# Patient Record
Sex: FEMALE | Race: ASIAN | Employment: FULL TIME | URBAN - METROPOLITAN AREA
[De-identification: names, ages, dates, MRNs, and addresses within clinical notes are randomized per-mention and may not be internally consistent; named-entity substitution may affect disease eponyms.]

---

## 2019-01-01 ENCOUNTER — APPOINTMENT (OUTPATIENT)
Dept: CT IMAGING | Facility: HOSPITAL | Age: 29
DRG: 955 | End: 2019-01-01
Attending: NURSE PRACTITIONER
Payer: COMMERCIAL

## 2019-01-01 ENCOUNTER — HOSPITAL ENCOUNTER (INPATIENT)
Facility: HOSPITAL | Age: 29
LOS: 13 days | DRG: 955 | End: 2019-01-01
Attending: EMERGENCY MEDICINE | Admitting: SURGERY
Payer: COMMERCIAL

## 2019-01-01 ENCOUNTER — ANESTHESIA EVENT (OUTPATIENT)
Dept: SURGERY | Facility: HOSPITAL | Age: 29
End: 2019-01-01

## 2019-01-01 ENCOUNTER — ANESTHESIA (OUTPATIENT)
Dept: SURGERY | Facility: HOSPITAL | Age: 29
End: 2019-01-01

## 2019-01-01 ENCOUNTER — APPOINTMENT (OUTPATIENT)
Dept: GENERAL RADIOLOGY | Facility: HOSPITAL | Age: 29
DRG: 955 | End: 2019-01-01
Attending: NURSE PRACTITIONER
Payer: COMMERCIAL

## 2019-01-01 ENCOUNTER — APPOINTMENT (OUTPATIENT)
Dept: CV DIAGNOSTICS | Facility: HOSPITAL | Age: 29
DRG: 955 | End: 2019-01-01
Attending: INTERNAL MEDICINE
Payer: COMMERCIAL

## 2019-01-01 ENCOUNTER — APPOINTMENT (OUTPATIENT)
Dept: GENERAL RADIOLOGY | Facility: HOSPITAL | Age: 29
DRG: 955 | End: 2019-01-01
Attending: INTERNAL MEDICINE
Payer: COMMERCIAL

## 2019-01-01 ENCOUNTER — HOSPITAL ENCOUNTER (EMERGENCY)
Facility: HOSPITAL | Age: 29
Discharge: STILL ADMITTED OR ADMINISTRATIVE | End: 2019-01-01

## 2019-01-01 ENCOUNTER — APPOINTMENT (OUTPATIENT)
Dept: GENERAL RADIOLOGY | Facility: HOSPITAL | Age: 29
DRG: 955 | End: 2019-01-01
Attending: Other
Payer: COMMERCIAL

## 2019-01-01 ENCOUNTER — APPOINTMENT (OUTPATIENT)
Dept: CT IMAGING | Facility: HOSPITAL | Age: 29
DRG: 955 | End: 2019-01-01
Attending: EMERGENCY MEDICINE
Payer: COMMERCIAL

## 2019-01-01 ENCOUNTER — APPOINTMENT (OUTPATIENT)
Dept: INTERVENTIONAL RADIOLOGY/VASCULAR | Facility: HOSPITAL | Age: 29
DRG: 955 | End: 2019-01-01
Attending: NURSE PRACTITIONER
Payer: COMMERCIAL

## 2019-01-01 ENCOUNTER — APPOINTMENT (OUTPATIENT)
Dept: CT IMAGING | Facility: HOSPITAL | Age: 29
DRG: 955 | End: 2019-01-01
Attending: NEUROLOGICAL SURGERY
Payer: COMMERCIAL

## 2019-01-01 ENCOUNTER — APPOINTMENT (OUTPATIENT)
Dept: CT IMAGING | Facility: HOSPITAL | Age: 29
DRG: 955 | End: 2019-01-01
Attending: Other
Payer: COMMERCIAL

## 2019-01-01 VITALS
RESPIRATION RATE: 16 BRPM | HEART RATE: 145 BPM | SYSTOLIC BLOOD PRESSURE: 139 MMHG | WEIGHT: 139.75 LBS | DIASTOLIC BLOOD PRESSURE: 92 MMHG | HEIGHT: 64 IN | BODY MASS INDEX: 23.86 KG/M2 | OXYGEN SATURATION: 76 % | TEMPERATURE: 101 F

## 2019-01-01 DIAGNOSIS — S06.6X3A: Primary | ICD-10-CM

## 2019-01-01 DIAGNOSIS — S06.5X9A ACUTE SUBDURAL HEMATOMA (HCC): ICD-10-CM

## 2019-01-01 PROCEDURE — 99233 SBSQ HOSP IP/OBS HIGH 50: CPT | Performed by: HOSPITALIST

## 2019-01-01 PROCEDURE — 70450 CT HEAD/BRAIN W/O DYE: CPT | Performed by: NEUROLOGICAL SURGERY

## 2019-01-01 PROCEDURE — 70450 CT HEAD/BRAIN W/O DYE: CPT | Performed by: OTHER

## 2019-01-01 PROCEDURE — 99291 CRITICAL CARE FIRST HOUR: CPT | Performed by: OTHER

## 2019-01-01 PROCEDURE — 71045 X-RAY EXAM CHEST 1 VIEW: CPT | Performed by: OTHER

## 2019-01-01 PROCEDURE — 99232 SBSQ HOSP IP/OBS MODERATE 35: CPT | Performed by: HOSPITALIST

## 2019-01-01 PROCEDURE — 93005 ELECTROCARDIOGRAM TRACING: CPT

## 2019-01-01 PROCEDURE — 99024 POSTOP FOLLOW-UP VISIT: CPT | Performed by: NEUROLOGICAL SURGERY

## 2019-01-01 PROCEDURE — 95951 EEG MONITORING/VIDEORECORD: CPT | Performed by: OTHER

## 2019-01-01 PROCEDURE — 00C70ZZ EXTIRPATION OF MATTER FROM CEREBRAL HEMISPHERE, OPEN APPROACH: ICD-10-PCS | Performed by: NEUROLOGICAL SURGERY

## 2019-01-01 PROCEDURE — 99232 SBSQ HOSP IP/OBS MODERATE 35: CPT | Performed by: INTERNAL MEDICINE

## 2019-01-01 PROCEDURE — 70450 CT HEAD/BRAIN W/O DYE: CPT | Performed by: NURSE PRACTITIONER

## 2019-01-01 PROCEDURE — 93306 TTE W/DOPPLER COMPLETE: CPT | Performed by: INTERNAL MEDICINE

## 2019-01-01 PROCEDURE — 00C40ZZ EXTIRPATION OF MATTER FROM INTRACRANIAL SUBDURAL SPACE, OPEN APPROACH: ICD-10-PCS | Performed by: NEUROLOGICAL SURGERY

## 2019-01-01 PROCEDURE — 30233N1 TRANSFUSION OF NONAUTOLOGOUS RED BLOOD CELLS INTO PERIPHERAL VEIN, PERCUTANEOUS APPROACH: ICD-10-PCS | Performed by: INTERNAL MEDICINE

## 2019-01-01 PROCEDURE — 99255 IP/OBS CONSLTJ NEW/EST HI 80: CPT | Performed by: INTERNAL MEDICINE

## 2019-01-01 PROCEDURE — 99291 CRITICAL CARE FIRST HOUR: CPT | Performed by: NURSE PRACTITIONER

## 2019-01-01 PROCEDURE — 99024 POSTOP FOLLOW-UP VISIT: CPT | Performed by: PHYSICIAN ASSISTANT

## 2019-01-01 PROCEDURE — 70498 CT ANGIOGRAPHY NECK: CPT | Performed by: NURSE PRACTITIONER

## 2019-01-01 PROCEDURE — 30233R1 TRANSFUSION OF NONAUTOLOGOUS PLATELETS INTO PERIPHERAL VEIN, PERCUTANEOUS APPROACH: ICD-10-PCS | Performed by: INTERNAL MEDICINE

## 2019-01-01 PROCEDURE — 72125 CT NECK SPINE W/O DYE: CPT | Performed by: EMERGENCY MEDICINE

## 2019-01-01 PROCEDURE — 99253 IP/OBS CNSLTJ NEW/EST LOW 45: CPT | Performed by: NURSE PRACTITIONER

## 2019-01-01 PROCEDURE — 74177 CT ABD & PELVIS W/CONTRAST: CPT | Performed by: EMERGENCY MEDICINE

## 2019-01-01 PROCEDURE — 0BH17EZ INSERTION OF ENDOTRACHEAL AIRWAY INTO TRACHEA, VIA NATURAL OR ARTIFICIAL OPENING: ICD-10-PCS | Performed by: EMERGENCY MEDICINE

## 2019-01-01 PROCEDURE — 70496 CT ANGIOGRAPHY HEAD: CPT | Performed by: NURSE PRACTITIONER

## 2019-01-01 PROCEDURE — B31CYZZ FLUOROSCOPY OF BILATERAL EXTERNAL CAROTID ARTERIES USING OTHER CONTRAST: ICD-10-PCS

## 2019-01-01 PROCEDURE — 99254 IP/OBS CNSLTJ NEW/EST MOD 60: CPT | Performed by: SURGERY

## 2019-01-01 PROCEDURE — 99292 CRITICAL CARE ADDL 30 MIN: CPT | Performed by: NURSE PRACTITIONER

## 2019-01-01 PROCEDURE — B318YZZ FLUOROSCOPY OF BILATERAL INTERNAL CAROTID ARTERIES USING OTHER CONTRAST: ICD-10-PCS

## 2019-01-01 PROCEDURE — 02HV33Z INSERTION OF INFUSION DEVICE INTO SUPERIOR VENA CAVA, PERCUTANEOUS APPROACH: ICD-10-PCS | Performed by: INTERNAL MEDICINE

## 2019-01-01 PROCEDURE — 71045 X-RAY EXAM CHEST 1 VIEW: CPT | Performed by: INTERNAL MEDICINE

## 2019-01-01 PROCEDURE — 71260 CT THORAX DX C+: CPT | Performed by: EMERGENCY MEDICINE

## 2019-01-01 PROCEDURE — 5A1955Z RESPIRATORY VENTILATION, GREATER THAN 96 CONSECUTIVE HOURS: ICD-10-PCS | Performed by: EMERGENCY MEDICINE

## 2019-01-01 PROCEDURE — 99253 IP/OBS CNSLTJ NEW/EST LOW 45: CPT | Performed by: NEUROLOGICAL SURGERY

## 2019-01-01 PROCEDURE — B31GYZZ FLUOROSCOPY OF BILATERAL VERTEBRAL ARTERIES USING OTHER CONTRAST: ICD-10-PCS

## 2019-01-01 PROCEDURE — 71275 CT ANGIOGRAPHY CHEST: CPT | Performed by: NURSE PRACTITIONER

## 2019-01-01 PROCEDURE — 70450 CT HEAD/BRAIN W/O DYE: CPT | Performed by: EMERGENCY MEDICINE

## 2019-01-01 DEVICE — BURR HOLE COVER, WITH TAB, 10MM
Type: IMPLANTABLE DEVICE | Site: HEAD | Status: FUNCTIONAL
Brand: UNIVERSAL NEURO 3

## 2019-01-01 DEVICE — SCREW, AXS, SELF-DRILLING
Type: IMPLANTABLE DEVICE | Site: HEAD | Status: FUNCTIONAL
Brand: UNIVERSAL NEURO 3

## 2019-01-01 DEVICE — COLLAGEN DURA SUBSTITUTE MEMBRANE 5IN X 7IN (12.5CM X 17.5CM)
Type: IMPLANTABLE DEVICE | Site: HEAD | Status: FUNCTIONAL
Brand: DURAMATRIX ONLAY

## 2019-01-01 DEVICE — KIT TISS CLOS TISSEEL 4ML: Type: IMPLANTABLE DEVICE | Site: HEAD | Status: FUNCTIONAL

## 2019-01-01 DEVICE — DOUBLE-Y PLATE
Type: IMPLANTABLE DEVICE | Site: HEAD | Status: FUNCTIONAL
Brand: UNIVERSAL NEURO 3

## 2019-01-01 RX ORDER — HEPARIN SODIUM 5000 [USP'U]/ML
5000 INJECTION, SOLUTION INTRAVENOUS; SUBCUTANEOUS EVERY 8 HOURS SCHEDULED
Status: DISCONTINUED | OUTPATIENT
Start: 2019-01-01 | End: 2019-01-01

## 2019-01-01 RX ORDER — MIDAZOLAM HYDROCHLORIDE 5 MG/ML
2 INJECTION INTRAMUSCULAR; INTRAVENOUS ONCE
Status: DISCONTINUED | OUTPATIENT
Start: 2019-01-01 | End: 2019-01-01

## 2019-01-01 RX ORDER — ACETAMINOPHEN 325 MG/1
650 TABLET ORAL EVERY 6 HOURS PRN
Status: DISCONTINUED | OUTPATIENT
Start: 2019-01-01 | End: 2019-01-01

## 2019-01-01 RX ORDER — 3% SODIUM CHLORIDE 3 G/100ML
INJECTION, SOLUTION INTRAVENOUS CONTINUOUS
Status: DISCONTINUED | OUTPATIENT
Start: 2019-01-01 | End: 2019-01-01

## 2019-01-01 RX ORDER — VERAPAMIL HYDROCHLORIDE 2.5 MG/ML
INJECTION, SOLUTION INTRAVENOUS
Status: COMPLETED
Start: 2019-01-01 | End: 2019-01-01

## 2019-01-01 RX ORDER — METOPROLOL TARTRATE 5 MG/5ML
2.5 INJECTION INTRAVENOUS EVERY 4 HOURS
Status: DISCONTINUED | OUTPATIENT
Start: 2019-01-01 | End: 2019-01-01

## 2019-01-01 RX ORDER — LORAZEPAM 2 MG/ML
1 INJECTION INTRAMUSCULAR EVERY 4 HOURS PRN
Status: CANCELLED | OUTPATIENT
Start: 2019-01-01

## 2019-01-01 RX ORDER — GLYCOPYRROLATE 0.2 MG/ML
0.2 INJECTION, SOLUTION INTRAMUSCULAR; INTRAVENOUS
Status: DISCONTINUED | OUTPATIENT
Start: 2019-01-01 | End: 2019-01-01

## 2019-01-01 RX ORDER — IPRATROPIUM BROMIDE AND ALBUTEROL SULFATE 2.5; .5 MG/3ML; MG/3ML
3 SOLUTION RESPIRATORY (INHALATION) 3 TIMES DAILY
Status: DISCONTINUED | OUTPATIENT
Start: 2019-01-01 | End: 2019-01-01

## 2019-01-01 RX ORDER — GLYCOPYRROLATE 0.2 MG/ML
0.2 INJECTION, SOLUTION INTRAMUSCULAR; INTRAVENOUS
Status: CANCELLED | OUTPATIENT
Start: 2019-01-01

## 2019-01-01 RX ORDER — BACITRACIN 50000 [USP'U]/1
INJECTION, POWDER, LYOPHILIZED, FOR SOLUTION INTRAMUSCULAR AS NEEDED
Status: DISCONTINUED | OUTPATIENT
Start: 2019-01-01 | End: 2019-01-01 | Stop reason: HOSPADM

## 2019-01-01 RX ORDER — POTASSIUM CHLORIDE 29.8 MG/ML
40 INJECTION INTRAVENOUS ONCE
Status: COMPLETED | OUTPATIENT
Start: 2019-01-01 | End: 2019-01-01

## 2019-01-01 RX ORDER — ONDANSETRON 2 MG/ML
4 INJECTION INTRAMUSCULAR; INTRAVENOUS EVERY 6 HOURS PRN
Status: DISCONTINUED | OUTPATIENT
Start: 2019-01-01 | End: 2019-01-01

## 2019-01-01 RX ORDER — MANNITOL 20 G/100ML
100 INJECTION, SOLUTION INTRAVENOUS ONCE
Status: COMPLETED | OUTPATIENT
Start: 2019-01-01 | End: 2019-01-01

## 2019-01-01 RX ORDER — MANNITOL 20 G/100ML
60 INJECTION, SOLUTION INTRAVENOUS ONCE
Status: DISCONTINUED | OUTPATIENT
Start: 2019-01-01 | End: 2019-01-01

## 2019-01-01 RX ORDER — NALOXONE HYDROCHLORIDE 0.4 MG/ML
80 INJECTION, SOLUTION INTRAMUSCULAR; INTRAVENOUS; SUBCUTANEOUS AS NEEDED
Status: ACTIVE | OUTPATIENT
Start: 2019-01-01 | End: 2019-01-01

## 2019-01-01 RX ORDER — SODIUM PHOSPHATE, DIBASIC AND SODIUM PHOSPHATE, MONOBASIC 7; 19 G/133ML; G/133ML
1 ENEMA RECTAL ONCE AS NEEDED
Status: DISCONTINUED | OUTPATIENT
Start: 2019-01-01 | End: 2019-01-01

## 2019-01-01 RX ORDER — ACETAMINOPHEN 160 MG/5ML
650 SOLUTION ORAL EVERY 6 HOURS PRN
Status: DISCONTINUED | OUTPATIENT
Start: 2019-01-01 | End: 2019-01-01

## 2019-01-01 RX ORDER — IPRATROPIUM BROMIDE AND ALBUTEROL SULFATE 2.5; .5 MG/3ML; MG/3ML
3 SOLUTION RESPIRATORY (INHALATION) 2 TIMES DAILY
Status: DISCONTINUED | OUTPATIENT
Start: 2019-01-01 | End: 2019-01-01

## 2019-01-01 RX ORDER — PHENYLEPHRINE HCL IN 0.9% NACL 50MG/250ML
PLASTIC BAG, INJECTION (ML) INTRAVENOUS CONTINUOUS PRN
Status: DISCONTINUED | OUTPATIENT
Start: 2019-01-01 | End: 2019-01-01

## 2019-01-01 RX ORDER — MORPHINE SULFATE 4 MG/ML
2 INJECTION, SOLUTION INTRAMUSCULAR; INTRAVENOUS ONCE
Status: CANCELLED | OUTPATIENT
Start: 2019-01-01

## 2019-01-01 RX ORDER — MORPHINE SULFATE 4 MG/ML
4 INJECTION, SOLUTION INTRAMUSCULAR; INTRAVENOUS EVERY 2 HOUR PRN
Status: DISCONTINUED | OUTPATIENT
Start: 2019-01-01 | End: 2019-01-01

## 2019-01-01 RX ORDER — MINERAL OIL AND PETROLATUM 150; 830 MG/G; MG/G
OINTMENT OPHTHALMIC 3 TIMES DAILY
Status: DISCONTINUED | OUTPATIENT
Start: 2019-01-01 | End: 2019-01-01

## 2019-01-01 RX ORDER — LORAZEPAM 2 MG/ML
1 INJECTION INTRAMUSCULAR
Status: DISCONTINUED | OUTPATIENT
Start: 2019-01-01 | End: 2019-01-01

## 2019-01-01 RX ORDER — SODIUM CHLORIDE, SODIUM LACTATE, POTASSIUM CHLORIDE, CALCIUM CHLORIDE 600; 310; 30; 20 MG/100ML; MG/100ML; MG/100ML; MG/100ML
INJECTION, SOLUTION INTRAVENOUS CONTINUOUS
Status: CANCELLED | OUTPATIENT
Start: 2019-01-01

## 2019-01-01 RX ORDER — POLYETHYLENE GLYCOL 3350 17 G/17G
17 POWDER, FOR SOLUTION ORAL DAILY PRN
Status: DISCONTINUED | OUTPATIENT
Start: 2019-01-01 | End: 2019-01-01

## 2019-01-01 RX ORDER — MIDAZOLAM HYDROCHLORIDE 1 MG/ML
INJECTION INTRAMUSCULAR; INTRAVENOUS
Status: DISCONTINUED
Start: 2019-01-01 | End: 2019-01-01 | Stop reason: WASHOUT

## 2019-01-01 RX ORDER — ACETAMINOPHEN 650 MG/1
650 SUPPOSITORY RECTAL EVERY 6 HOURS PRN
Status: DISCONTINUED | OUTPATIENT
Start: 2019-01-01 | End: 2019-01-01

## 2019-01-01 RX ORDER — METOPROLOL TARTRATE 5 MG/5ML
2.5 INJECTION INTRAVENOUS EVERY 6 HOURS
Status: DISCONTINUED | OUTPATIENT
Start: 2019-01-01 | End: 2019-01-01

## 2019-01-01 RX ORDER — ACETAMINOPHEN 650 MG/1
650 SUPPOSITORY RECTAL EVERY 4 HOURS PRN
Status: DISCONTINUED | OUTPATIENT
Start: 2019-01-01 | End: 2019-01-01

## 2019-01-01 RX ORDER — LABETALOL HYDROCHLORIDE 5 MG/ML
10 INJECTION, SOLUTION INTRAVENOUS EVERY 10 MIN PRN
Status: DISCONTINUED | OUTPATIENT
Start: 2019-01-01 | End: 2019-01-01

## 2019-01-01 RX ORDER — FAMOTIDINE 20 MG/1
20 TABLET ORAL 2 TIMES DAILY
Status: DISCONTINUED | OUTPATIENT
Start: 2019-01-01 | End: 2019-01-01

## 2019-01-01 RX ORDER — MANNITOL 20 G/100ML
INJECTION, SOLUTION INTRAVENOUS
Status: DISPENSED
Start: 2019-01-01 | End: 2019-01-01

## 2019-01-01 RX ORDER — HEPARIN SODIUM 5000 [USP'U]/ML
INJECTION, SOLUTION INTRAVENOUS; SUBCUTANEOUS
Status: COMPLETED
Start: 2019-01-01 | End: 2019-01-01

## 2019-01-01 RX ORDER — LORAZEPAM 2 MG/ML
0.5 INJECTION INTRAMUSCULAR
Status: DISCONTINUED | OUTPATIENT
Start: 2019-01-01 | End: 2019-01-01

## 2019-01-01 RX ORDER — MORPHINE SULFATE 4 MG/ML
2 INJECTION, SOLUTION INTRAMUSCULAR; INTRAVENOUS ONCE
Status: DISCONTINUED | OUTPATIENT
Start: 2019-01-01 | End: 2019-01-01

## 2019-01-01 RX ORDER — GLYCOPYRROLATE 0.2 MG/ML
0.4 INJECTION, SOLUTION INTRAMUSCULAR; INTRAVENOUS ONCE
Status: CANCELLED | OUTPATIENT
Start: 2019-01-01 | End: 2019-01-01

## 2019-01-01 RX ORDER — MORPHINE SULFATE 4 MG/ML
1 INJECTION, SOLUTION INTRAMUSCULAR; INTRAVENOUS EVERY 2 HOUR PRN
Status: DISCONTINUED | OUTPATIENT
Start: 2019-01-01 | End: 2019-01-01

## 2019-01-01 RX ORDER — METOCLOPRAMIDE HYDROCHLORIDE 5 MG/ML
10 INJECTION INTRAMUSCULAR; INTRAVENOUS EVERY 8 HOURS PRN
Status: DISCONTINUED | OUTPATIENT
Start: 2019-01-01 | End: 2019-01-01

## 2019-01-01 RX ORDER — LORAZEPAM 2 MG/ML
1 INJECTION INTRAMUSCULAR EVERY 4 HOURS PRN
Status: DISCONTINUED | OUTPATIENT
Start: 2019-01-01 | End: 2019-01-01

## 2019-01-01 RX ORDER — LIDOCAINE HYDROCHLORIDE AND EPINEPHRINE 10; 10 MG/ML; UG/ML
INJECTION, SOLUTION INFILTRATION; PERINEURAL AS NEEDED
Status: DISCONTINUED | OUTPATIENT
Start: 2019-01-01 | End: 2019-01-01 | Stop reason: HOSPADM

## 2019-01-01 RX ORDER — MORPHINE SULFATE 4 MG/ML
2 INJECTION, SOLUTION INTRAMUSCULAR; INTRAVENOUS EVERY 2 HOUR PRN
Status: DISCONTINUED | OUTPATIENT
Start: 2019-01-01 | End: 2019-01-01

## 2019-01-01 RX ORDER — POTASSIUM CHLORIDE 14.9 MG/ML
20 INJECTION INTRAVENOUS ONCE
Status: COMPLETED | OUTPATIENT
Start: 2019-01-01 | End: 2019-01-01

## 2019-01-01 RX ORDER — HYDROMORPHONE HYDROCHLORIDE 1 MG/ML
0.5 INJECTION, SOLUTION INTRAMUSCULAR; INTRAVENOUS; SUBCUTANEOUS
Status: DISCONTINUED | OUTPATIENT
Start: 2019-01-01 | End: 2019-01-01

## 2019-01-01 RX ORDER — BISACODYL 10 MG
10 SUPPOSITORY, RECTAL RECTAL
Status: DISCONTINUED | OUTPATIENT
Start: 2019-01-01 | End: 2019-01-01

## 2019-01-01 RX ORDER — SODIUM CHLORIDE 30 MG/ML INHALATION SOLUTION 30 MG/ML
3 SOLUTION INHALANT
Status: DISCONTINUED | OUTPATIENT
Start: 2019-01-01 | End: 2019-01-01

## 2019-01-01 RX ORDER — LORAZEPAM 2 MG/ML
1 INJECTION INTRAMUSCULAR ONCE
Status: DISCONTINUED | OUTPATIENT
Start: 2019-01-01 | End: 2019-01-01

## 2019-01-01 RX ORDER — POTASSIUM CHLORIDE 1.5 G/1.77G
40 POWDER, FOR SOLUTION ORAL EVERY 4 HOURS
Status: COMPLETED | OUTPATIENT
Start: 2019-01-01 | End: 2019-01-01

## 2019-01-01 RX ORDER — MIDAZOLAM HYDROCHLORIDE 5 MG/ML
2 INJECTION INTRAMUSCULAR; INTRAVENOUS ONCE
Status: COMPLETED | OUTPATIENT
Start: 2019-01-01 | End: 2019-01-01

## 2019-01-01 RX ORDER — DOCUSATE SODIUM 100 MG/1
100 CAPSULE, LIQUID FILLED ORAL 2 TIMES DAILY
Status: DISCONTINUED | OUTPATIENT
Start: 2019-01-01 | End: 2019-01-01

## 2019-01-01 RX ORDER — LORAZEPAM 2 MG/ML
1 INJECTION INTRAMUSCULAR ONCE
Status: CANCELLED | OUTPATIENT
Start: 2019-01-01

## 2019-01-01 RX ORDER — CHLORHEXIDINE GLUCONATE 0.12 MG/ML
15 RINSE ORAL
Status: DISCONTINUED | OUTPATIENT
Start: 2019-01-01 | End: 2019-01-01

## 2019-01-01 RX ORDER — LIDOCAINE HYDROCHLORIDE 10 MG/ML
INJECTION, SOLUTION INFILTRATION; PERINEURAL
Status: COMPLETED
Start: 2019-01-01 | End: 2019-01-01

## 2019-01-01 RX ORDER — SODIUM CHLORIDE 9 MG/ML
INJECTION, SOLUTION INTRAVENOUS ONCE
Status: COMPLETED | OUTPATIENT
Start: 2019-01-01 | End: 2019-01-01

## 2019-01-01 RX ORDER — SCOLOPAMINE TRANSDERMAL SYSTEM 1 MG/1
1 PATCH, EXTENDED RELEASE TRANSDERMAL
Status: DISCONTINUED | OUTPATIENT
Start: 2019-01-01 | End: 2019-01-01

## 2019-01-01 RX ORDER — BUPIVACAINE HYDROCHLORIDE AND EPINEPHRINE 2.5; 5 MG/ML; UG/ML
INJECTION, SOLUTION EPIDURAL; INFILTRATION; INTRACAUDAL; PERINEURAL AS NEEDED
Status: DISCONTINUED | OUTPATIENT
Start: 2019-01-01 | End: 2019-01-01 | Stop reason: HOSPADM

## 2019-01-01 RX ORDER — CEFAZOLIN SODIUM/WATER 2 G/20 ML
2 SYRINGE (ML) INTRAVENOUS EVERY 8 HOURS
Status: DISCONTINUED | OUTPATIENT
Start: 2019-01-01 | End: 2019-01-01

## 2019-01-01 RX ORDER — CEFAZOLIN SODIUM 1 G/3ML
INJECTION, POWDER, FOR SOLUTION INTRAMUSCULAR; INTRAVENOUS
Status: DISCONTINUED | OUTPATIENT
Start: 2019-01-01 | End: 2019-01-01 | Stop reason: HOSPADM

## 2019-01-01 RX ORDER — ACETAMINOPHEN 325 MG/1
650 TABLET ORAL EVERY 4 HOURS PRN
Status: DISCONTINUED | OUTPATIENT
Start: 2019-01-01 | End: 2019-01-01

## 2019-01-01 RX ORDER — DEXTROSE AND SODIUM CHLORIDE 5; .45 G/100ML; G/100ML
INJECTION, SOLUTION INTRAVENOUS CONTINUOUS
Status: DISCONTINUED | OUTPATIENT
Start: 2019-01-01 | End: 2019-01-01

## 2019-01-01 RX ORDER — MAGNESIUM SULFATE HEPTAHYDRATE 40 MG/ML
2 INJECTION, SOLUTION INTRAVENOUS ONCE
Status: DISCONTINUED | OUTPATIENT
Start: 2019-01-01 | End: 2019-01-01

## 2019-01-01 RX ORDER — SODIUM CHLORIDE 9 MG/ML
INJECTION, SOLUTION INTRAVENOUS CONTINUOUS
Status: CANCELLED | OUTPATIENT
Start: 2019-01-01

## 2019-01-01 RX ORDER — ACETAMINOPHEN 160 MG/5ML
650 SOLUTION ORAL EVERY 4 HOURS PRN
Status: DISCONTINUED | OUTPATIENT
Start: 2019-01-01 | End: 2019-01-01

## 2019-01-01 RX ORDER — GLYCOPYRROLATE 0.2 MG/ML
0.4 INJECTION, SOLUTION INTRAMUSCULAR; INTRAVENOUS ONCE
Status: DISCONTINUED | OUTPATIENT
Start: 2019-01-01 | End: 2019-01-01

## 2019-01-01 RX ORDER — SENNOSIDES 8.6 MG
17.2 TABLET ORAL NIGHTLY
Status: DISCONTINUED | OUTPATIENT
Start: 2019-01-01 | End: 2019-01-01

## 2019-01-01 RX ORDER — SCOLOPAMINE TRANSDERMAL SYSTEM 1 MG/1
1 PATCH, EXTENDED RELEASE TRANSDERMAL
Status: CANCELLED | OUTPATIENT
Start: 2019-01-01

## 2019-01-01 RX ORDER — FAMOTIDINE 10 MG/ML
20 INJECTION, SOLUTION INTRAVENOUS 2 TIMES DAILY
Status: DISCONTINUED | OUTPATIENT
Start: 2019-01-01 | End: 2019-01-01

## 2019-01-01 RX ORDER — SODIUM CHLORIDE 0.9 % (FLUSH) 0.9 %
10 SYRINGE (ML) INJECTION AS NEEDED
Status: DISCONTINUED | OUTPATIENT
Start: 2019-01-01 | End: 2019-01-01

## 2019-01-01 RX ORDER — LABETALOL HYDROCHLORIDE 5 MG/ML
10 INJECTION, SOLUTION INTRAVENOUS AS NEEDED
Status: DISCONTINUED | OUTPATIENT
Start: 2019-01-01 | End: 2019-01-01

## 2019-01-01 RX ORDER — ETOMIDATE 2 MG/ML
INJECTION INTRAVENOUS
Status: COMPLETED | OUTPATIENT
Start: 2019-01-01 | End: 2019-01-01

## 2019-01-01 RX ORDER — SODIUM CHLORIDE 9 MG/ML
INJECTION, SOLUTION INTRAVENOUS CONTINUOUS
Status: DISCONTINUED | OUTPATIENT
Start: 2019-01-01 | End: 2019-01-01

## 2019-01-01 RX ORDER — GLYCOPYRROLATE 0.2 MG/ML
0.4 INJECTION, SOLUTION INTRAMUSCULAR; INTRAVENOUS ONCE
Status: COMPLETED | OUTPATIENT
Start: 2019-01-01 | End: 2019-01-01

## 2019-10-01 PROBLEM — S06.6X3A: Status: ACTIVE | Noted: 2019-01-01

## 2019-10-01 PROBLEM — S06.5X9A ACUTE SUBDURAL HEMATOMA (HCC): Status: ACTIVE | Noted: 2019-01-01

## 2019-10-02 PROBLEM — S06.4X9A EPIDURAL HEMATOMA (HCC): Status: ACTIVE | Noted: 2019-01-01

## 2019-10-02 NOTE — CONSULTS
ORTHOPEDIC SURGERY CONSULT FOLLOW-UP NOTE  Ortho Consulting Attending: Lola Delong MD  Primary Service: Neurosurgery    Intubated at this time, abdomen nondistended  SP craniectomy overnight, now with guarded prognosis  From an orthopedic standpoint, she
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
## 2019-10-02 NOTE — ANESTHESIA PREPROCEDURE EVALUATION
PRE-OP EVALUATION    Patient Name: Tamara Valenzuela    Pre-op Diagnosis: * No pre-op diagnosis entered *    * No procedures listed *    * No surgeons found in log *    Pre-op vitals reviewed.   Temp: 98.2 °F (36.8 °C)  Pulse: 96  Resp: 14  BP: 120/93  Arterial L Once   Followed by      [COMPLETED] potassium chloride IVPB premix 20 mEq 20 mEq Intravenous Once   3% NaCl (hypertonic) infusion  Intravenous Continuous   HYDROmorphone HCl (DILAUDID) 1 MG/ML injection 0.5 mg 0.5 mg Intravenous Q1H PRN   acetaminophen (TY 1,000 mg Intravenous Once       Outpatient Medications:    No outpatient medications have been marked as taking for the 10/1/19 encounter [facility] Encounter). Allergies: Patient has no allergy information on record.       Anesthesia Evaluation    Patien
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
## 2019-10-02 NOTE — CONSULTS
[facility]  Report of Consultation    Terris Nettle Patient Status:  Emergency    1990 MRN [id number]   Location [address] Attending Neha Shelton MD   Hosp Day # 0 PCP No primary care provider on file.      Date of icterus. Pupils are equal and sluggishly reactive- mucous membranes are moist.   TMs bilaterally are clear-no hemotympanum noted. Oropharynx is clear.   No evidence of  facial trauma  Neck: C-collar in place-no JVD noted-no subcutaneous emphysema  Lungs: hemisphere extending from anterior to posterior. The hematoma measures between 6   and 12 mm.   There is a thin subdural hematoma over the right convexity best seen on the coronal images at the level of the mid frontal operculum measuring approximately 3 m hematoma  CT C-spine-Skull base fractures. Occipital fractures. Fracture of the petrous temporal bone on the right now evident within section imaging at 1 mm sections.   Fracture involving the base of the left occipital bone and medial aspect of the garcia

## 2019-10-02 NOTE — ANESTHESIA POSTPROCEDURE EVALUATION
[address] [facility] Patient Status:  Inpatient   Age/Gender 29year old female MRN [id number]   [facility] SURGERY Attending Marques Retana, [address] [facility] Se Day # 1 PCP Unknown Pcp       Anesthesia Post-op Note    Procedure(s):  Jackson Yañez

## 2019-10-02 NOTE — OPERATIVE REPORT
[facility]    OPERATIVE REPORT    Patient:  Farrah Armenta;  YOB: 1990     CSN:  [id number]; Medical Record Number:  [id number]    Admission Date:  10/1/2019 Operation Date:  10/1/2019    . ..........................     Operating Physician: roll tied to the end of the bed via an aaron clamp. Ely holes were placed just above the zygoma, at the anatomic keyhole, over the coronal suture and over the parietal eminence. The dura was stripped from the inner table with a 3 Penfield dissector.
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
## 2019-10-02 NOTE — ANESTHESIA POSTPROCEDURE EVALUATION
[address] [facility] Patient Status:  Inpatient   Age/Gender 29year old female MRN [id number]   [facility] 6NE-A Attending Mary Marroquin MD   [facility] Day # 1 PCP Unknown Pcp       Anesthesia Post-op Note    Procedure(s):  LEFT [facility]

## 2019-10-02 NOTE — PROGRESS NOTES
Neurosurgery Update    Postop CT, subsequent CTA/CTV reviewed.   S/p craniectomy, with evacuation of prior SDH, with new areas of primarily left sided, frontal and temporal IPH, bifrontal epidural hematoma, as well as a large occipital and suboccipital extr

## 2019-10-02 NOTE — ANESTHESIA PREPROCEDURE EVALUATION
PRE-OP EVALUATION    Patient Name: Matthew Cronin    Pre-op Diagnosis: Subdural bleeding ([address] [I62.00]    Procedure(s):  LEFT CRANIECTOMY FOR SUBDURAL WITH POSSIBLE PLACEMENT OF   EXTERNAL VENTRICULAR DRAIN    Surgeon(s) and Role:     MD Ryan Hunt P reviewed.   Lab Results   Component Value Date    WBC 10.2 10/01/2019    RBC 4.17 10/01/2019    HGB 12.3 10/01/2019    HCT 35.0 10/01/2019    MCV 83.9 10/01/2019    MCH 29.5 10/01/2019    MCHC 35.1 10/01/2019    RDW 13.5 10/01/2019    .0 10/01/2019

## 2019-10-02 NOTE — OCCUPATIONAL THERAPY NOTE
OT order received, chart reviewed, pt intubated and on bed rest, poor prognosis, Rn stating to D/C OT order, OT will sign off at this time.

## 2019-10-02 NOTE — PHYSICAL THERAPY NOTE
Physical therapy consult received, chart reviewed. Pt intubated on bedrest, with poor prognosis. Will sign off at this time.

## 2019-10-02 NOTE — PROGRESS NOTES
[facility]  Neurosurgery Progress Note    Andreia Castro Patient Status:  Inpatient    1990 MRN [id number]   [facility] 6NE-A Attending Phillip Brooks MD   Hosp Day # 1 PCP Unknown Pcp     Chief Complaint:  Trauma  TBI    Subjective right frontal og hole. Overall gray-white matter differentiation is maintained. Interval development of numerous intraparenchymal hematomas. These are predominately on the left, with frontal, temporal and occipital components.   The largest is fro scan performed prior to CTA and CTV. Ventricles are also stable in caliber.        =====  CONCLUSION:  Multiple areas of contrast extravasation are suggested consistent with areas of active bleeding. Details as above.   Continued clinical correlation juan craniectomy for evacuation of SDH  Diffuse cerebral edema  Bifrontal epidural hematomas, occipital and suboccipital extra-axial hemorrhage, scattered IPH/SAH  Traumatic left transverse sinus laceration  Multiple skull fractures    Plan:  Pt examined by

## 2019-10-02 NOTE — SLP NOTE
Order received. Note patient orally intubated and not appropriate for evaluation. Will hold and continue to follow completing evaluation as appropriate.     [facility] Gray 87 CCC-SLP  Pager [phone]
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
## 2019-10-02 NOTE — ED NOTES
PT ADMIN KEPPRA IV IN R/T DECEREBRATE POSTURE - IV BLEW (LT HAND) KEPPRA STOPPED R/T ED MD WANTING PT TO BE RUSHED TO OR - COMMUNICATED KEPPRA FAILURE TO OR(AWARE).

## 2019-10-02 NOTE — PROGRESS NOTES
Attempted to see patient. Patient off the floor all morning. Pt noted to have purposeful movement on the Left and posturing on the Right.  Patient taken for CT scan, then taken to IR for possible embolization due to concern for bleeding into left transverse

## 2019-10-03 NOTE — PLAN OF CARE
Pt has been neurologically stable. Pt with purposeful movements on the left side. Right side withdraws from painful stimuli leg quicker than the arm. Eyes swollen; pupils round and reactive. Pt with cough and gag present.   Pt followed command once toda

## 2019-10-03 NOTE — PROGRESS NOTES
10/03/19 1457   Clinical Encounter Type   Visited With Family; Health care provider;Patient not available  (Brother, father, RN)   Routine Visit Follow-up   Continue Visiting No  (unless requested)   Patient Spiritual Encounters   Spiritual Needs   Bessie Gongora

## 2019-10-03 NOTE — PROGRESS NOTES
[facility]  Progress Note    Sorin Pinto Patient Status:  Inpatient    1990 MRN [id number]   [facility] 6NE-A Attending Bao Carter MD   Hosp Day # 2 PCP Unknown Pcp     Subjective:   The patient is currently sedated and on day 1 suboccipital craniotomy for evacuation of epidural hematoma      Plan:  1. Continue further management as per neurosurgery, ICU team and hospitalist.  2. No general surgical issues at this time. 3. We will sign off patient.   Please reconsult if need

## 2019-10-03 NOTE — IMAGING NOTE
Doctor is aware that with this exam, patient will have received a total of 400mL of contrast since admitted
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
## 2019-10-03 NOTE — RESPIRATORY THERAPY NOTE
Arterial line inserted into the Left Radial Artery with a 20 GA Arrow kit. Able to draw and flush from line with ease. Good waveform observed. Sterile Technique used.
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
## 2019-10-03 NOTE — PROGRESS NOTES
Tachy overnight to 130-140s. BP stable and Neuro exam unchanged. LR bolus given x1 as patient is 3L negative in I and O.  EKG obtained and showed some Twave abnormalities but no previous one to compare.   Troponin had been elevated on admission and is onl

## 2019-10-03 NOTE — BRIEF OP NOTE
Pre-Operative Diagnosis: Hematoma [T14. 8XXA]     Post-Operative Diagnosis: Hematoma [T14. 8XXA]      Procedure Performed:   Procedure(s):  Flora Roman with evacuation of hematoma      Surgeon(s) and Role:     Junior Gigi MD - Primary     *

## 2019-10-03 NOTE — OPERATIVE REPORT
[facility]    OPERATIVE REPORT    Patient:  Matthew Cronin;  YOB: 1990     CSN:  [id number]; Medical Record Number:  [id number]    Admission Date:  10/1/2019 Operation Date:  10/2/2019    . ..........................     Operating Physician: the left side of the occipital bone with underlying thick hematoma. [place] holes were place in the suboccipital region, and bilaterally above the transverse sinus. The craniotomy was completed with the craniotome and 2 pieces were removed.   Brisk bleeding w
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
## 2019-10-03 NOTE — PROGRESS NOTES
[facility] Patient Status:  Inpatient    1990 MRN [id number]   [facility] 6NE-A Attending Colin To MD   Hosp Day # 2 PCP Unknown Pcp     Pulm / Critical Care Progress Note     S: underwent cerebral angiogr Readings from Last 3 Encounters:  10/03/19 : 139 lb 5.3 oz (63.2 kg)       I/O last 3 completed shifts: In: 2452 [I.V.:1582; Blood:600; IV PIGGYBACK:270]  Out: 0331 [NLIOV:8549; Drains:225; Blood:500]  I/O this shift: In: 4892.9 [I.V.:3802.9;  Blood:250; follow INR. FFP if it continues to rise. 4. Fen: start tf  - start free water given hypernatremia  5. Prophy: scds; no heparin given sah / sdh  6. Dispo: DNR/DNi. Await further details regarding prognosis per neuro services. - d/w family at bedside.
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
## 2019-10-03 NOTE — PROGRESS NOTES
Resumed care of patient at [address]. Supplemented K+. EKG completed during shift. Elevated T waves, troponin and d-dimer, and -130s. Stat CTA completed, Negative for PE. No desaturations occurred during shift. Mannitol given per orders.  Pt on propofol,

## 2019-10-04 NOTE — PLAN OF CARE
Pt remains on vent 10 400 35 +5 Pt transported to CT and back to room without incident wean as adrián

## 2019-10-04 NOTE — PLAN OF CARE
Pt has been neurologically stable throughout the day. Pt ventilated and sedated with propofol and fentanyl gtts. Pt facial swelling improved today. Pupils remain round and reactive.   Left eye with intermittent in and downward gaze when both eyes opened
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
## 2019-10-04 NOTE — CM/SW NOTE
10/04/19 1300   CM/SW Referral Data   Referral Source Physician   Reason for Referral Discharge planning   Informant   (Brother)   Patient Info   Patient's Mental Status   (Vent)   Patient lives with   (Father)   Patient Status Prior to Admission   In[place]
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
## 2019-10-04 NOTE — PROGRESS NOTES
[facility] Patient Status:  Inpatient    1990 MRN [id number]   [facility] 6NE-A Attending Tegan Mcnally MD   Hosp Day # 3 PCP Unknown Pcp     Pulm / Critical Care Progress Note     S: remains intubated.   Per do Readings from Last 3 Encounters:  10/04/19 : 141 lb 8.6 oz (64.2 kg)       I/O last 3 completed shifts:   In: 7456.9 [I.V.:5496.9; Blood:850; IV PIGGYBACK:1110]  Out: 38148 [ZDJJX:06807; Drains:510; Blood:650]  I/O this shift:  In: 7708 [I.V.:943; Blood:289 vitamin K with some improvement  - follow INR. FFP if it continues to rise. 4. Anemia: drop overnight likely dilutional  - follow, transfuse for hgb < 7; repeat later this am to ensure not dropping precipitously  5.  Fen: start tf  - start free water giv

## 2019-10-04 NOTE — PROGRESS NOTES
Assessment and Objective  \"Progressing\"  INTERVENTIONS:  - Assess for changes in respiratory status  - Assess for changes in mentation and behavior  - Position to facilitate oxygenation and minimize respiratory effort  - Oxygen supplementation based on o From [address] [facility] by Commercial tube javier   Site Condition Dry   Req'd equipment at bedside Bag mask     Notes:   -Patient tolerating ventilator settings, Continue VC+10/400/35%/+5    -Diminished BS bilaterally  Plans:    Cont

## 2019-10-04 NOTE — PROGRESS NOTES
Resumed care at 1. Pt moving L side spontaneously and to pain, not following commands. R side moves to painful stimuli. Bilateral pupils equal, round, and reactive.  Eyes tracked briefly to light pen bilateral sides, minimal movement x 1 during shift but

## 2019-10-04 NOTE — DIETARY NOTE
Clinical Nutrition    Dietitian consult to initiate tube feedings. Given extensive TBI s/p craniotomy pt has very high protein needs. Recommend vital high protein formula @ 20 ml/hr, advancing by 10 ml every 8 hours. Flush 150 ml q 6 hours.  TF rate Goal of

## 2019-10-05 NOTE — RESPIRATORY THERAPY NOTE
Received pt vented, PRVC 10/400/35%/+5. Pt appears to be breath stacking. Dr. Sulaiman Angel increased Vt to 450, breath stacking appears improved. Suctioning small amount of thick yellow secretions from ETT. Will continue to monitor closely.

## 2019-10-05 NOTE — PROGRESS NOTES
[facility] Patient Status:  Inpatient    1990 MRN [id number]   [facility] 6NE-A Attending Cary Corrales MD   [facility] Day # 4 PCP Unknown Pcp     Critical Care Progress Note     Date of Admission: 10/1/2019  7:45 P **OR** [DISCONTINUED] acetaminophen (TYLENOL) 650 MG rectal suppository 650 mg, 650 mg, Rectal, Q6H PRN  •  fentaNYL citrate (SUBLIMAZE) 1,000 mcg in sodium chloride 0.9% 100 mL infusion,  mcg/hr, Intravenous, Continuous PRN  •  docusate sodium (COLA Readings from Last 3 Encounters:  10/04/19 : 141 lb 8.6 oz (64.2 kg)       I/O last 3 completed shifts: In: 3312.8 [I.V.:1844.8; Blood:289; NG/GT:939; IV PIGGYBACK:240]  Out: 5276 [XMUXR:2006; Drains:345]  I/O this shift:   In: 79 [NG/GT:70]  Out: 575 [Uri at length.      Critical Care Time greater than: 35 minutes    Anamaria Joshi MD  10/5/2019  3:28 AM
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
## 2019-10-05 NOTE — PLAN OF CARE
Assumed care of PT at 0730. Pt lightly sedated with 25mcg/hr Fentanyl continuous infusion and Propofol 5mcg/kg/min intermittently as needed.  Propofol off for several hours this am then pt became very restless spontaneously moving left arm and leg and scoot 1630 draw lower at 155. Serum Osmolarity also lower at 315.
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
## 2019-10-05 NOTE — PLAN OF CARE
Assumed care of this patient at approximately 1930. Monitor shows SR/ST 80's-110's, SBP remained within parameters of less than 140 per MD order. Patient remains intubated, lightly sedated on fentanyl and propofol.   Patient unable to follow commands, pupil seizure activity  - Administer anti-seizure medications as ordered  - Monitor neurological status  Outcome: Progressing  Goal: Achieves maximal functionality and self care  Description  INTERVENTIONS  - Monitor swallowing and airway patency with patient fa
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
## 2019-10-06 NOTE — CONSULTS
INFECTIOUS DISEASE CONSULT NOTE    Bernadette Jain Patient Status:  Inpatient    1990 MRN [id number]   [facility] 6NE-A Attending Scott Porter MD   Hosp Day # 5 PCP Unknown Pcp **OR** morphINE sulfate (PF) 4 MG/ML injection 2 mg, 2 mg, Intravenous, Q2H PRN **OR** morphINE sulfate (PF) 4 MG/ML injection 4 mg, 4 mg, Intravenous, Q2H PRN  •  Labetalol HCl (TRANDATE) injection 10 mg, 10 mg, Intravenous, PRN  •  phenylephrine in NaCl PRN  •  LORazepam (ATIVAN) injection 1 mg, 1 mg, Intravenous, Q15 Min PRN  •  Normal Saline Flush 0.9 % injection 10 mL, 10 mL, Intravenous, PRN  •  influenza vaccine split quad (FLULAVAL) ages 6 months to 64 years inj 0.5ml, 0.5 mL, Intramuscular, Prior t 22.0   < > 18.0*  --  26.0  --   --  29.0  --   --    ALKPHO 60 55  --  55  --   --   --   --   --   --   --    AST 52* 50*  --  18  --   --   --   --   --   --   --    ALT 41 39  --  17  --   --   --   --   --   --   --    BILT 0.3 0.6  --  0.6  --   --

## 2019-10-06 NOTE — PROGRESS NOTES
Pharmacy Dosing Service  Initial Pharmacokinetic Consult for Vancomycin Dosing          Brooke Mcmullen is a 29year old female who is being treated for pneumonia. Pharmacy has been asked to dose vancomycin by Dr. Amos Simons.     She has No Known Allergies function.         Glorine Goldberg, PharmD, BCPS  10/6/2019  1:15 PM  [address] [place] [address] Extension: [phone]

## 2019-10-06 NOTE — RESPIRATORY THERAPY NOTE
Patient received on PRVC 10/450/35%+5. No distress at this time. Routine care given. Suctioned a small amount of thin clear secretions orally. Will continue to monitor closely.

## 2019-10-06 NOTE — PLAN OF CARE
Assumed care of this patient at approximately 1930. Monitor shows SR/ST 80's-110's, SBP maintained within parameters of less than 140.   Patient does not follow commands, continues to move left upper and lower extremities spontaneously, right upper extremi respiratory difficulty  - Respiratory Therapy support as indicated  - Manage/alleviate anxiety  - Monitor for signs/symptoms of CO2 retention  Outcome: Progressing     Problem: NEUROLOGICAL - ADULT  Goal: Achieves stable or improved neurological status  Andreia Schroeder

## 2019-10-06 NOTE — RESPIRATORY THERAPY NOTE
Patient received on vent settings PRVC 10/450/+5/35%. Breath sounds- clear. No changes made. Will continue to monitor patient.
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
## 2019-10-06 NOTE — PROGRESS NOTES
Neurological Surgery Attending    Lemont Kussmaul    Interval History: Patient seen and examined earlier this morning shortly after nursing had reported a seizure. She was reexamined an hour later with clinical improvement.     Interval Examination: Despite ac
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
## 2019-10-06 NOTE — PROGRESS NOTES
[facility] Patient Status:  Inpatient    1990 MRN [id number]   [facility] 6NE-A Attending Colin To MD   [facility] Day # 5 PCP Unknown Pcp     Critical Care Progress Note     Date of Admission: 10/1/2019  7:45 P mg, Oral, Q6H PRN **OR** acetaminophen (TYLENOL) 160 MG/5ML oral liquid 650 mg, 650 mg, Oral, Q6H PRN **OR** [DISCONTINUED] acetaminophen (TYLENOL) 650 MG rectal suppository 650 mg, 650 mg, Rectal, Q6H PRN  •  fentaNYL citrate (SUBLIMAZE) 1,000 mcg in sodi [place].Breaker %] 35 %  S RR:  [10] 10  S VT:  [450 mL] 450 mL  PEEP/CPAP (cm H2O):  [5 cm H20] 5 cm H20  MAP (cm H2O):  [5-7] 6      Wt Readings from Last 3 Encounters:  10/05/19 : 138 lb 10.7 oz (62.9 kg)       I/O last 3 completed shifts: In: 5563.6 [I.V.:2540. 6 normalized  - s/p vitamin K with some improvement  - follow INR, plts  4. Anemia: transfused 1u PRBC yesterday with adequate response.  - follow, transfuse for hgb < 7- no signs of active bleeding  5. F/E/N: hypernatremia.   at goal of Na of 145-155; improv

## 2019-10-06 NOTE — PLAN OF CARE
At 0830 Pt had witnessed Tonic/Clonic siezure involving left side of pt's body. PT had head turned all the way to the Left side and tonic clonic movements of both left arm and leg for aproximately 5 minutes. Dr Eric Kendall called.  Pt given Ativan 1mg, Propofol s
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
## 2019-10-07 NOTE — PROGRESS NOTES
[facility]                INFECTIOUS DISEASE PROGRESS NOTE    Lorraine Bridegroom Patient Status:  Inpatient    1990 MRN [id number]   [facility] 6NE-A Attending Simon Dallas MD   Hosp Day # 6 PCP Unknown Pcp     Antibiotics:van < > 3.6   < > 3.3*   < > 4.8  4.8 3.6  --   --   --  2.7*   CL 97* 107   < > 136*  --  132*  --  120*  --   --   --   --  106   CO2 22.0 22.0   < > 18.0*  --  26.0  --  29.0  --   --   --   --  29.0   ALKPHO 60 55  --  55  --   --   --   --   --   --   -- fractures (HCC)     Subdural hematoma (HCC)     Cerebral contusion and laceration (HCC)      ASSESSMENT/PLAN:  1.  Traumatic brain injury(autovspedest) with large subdural bleed  Sp left craniotomy -10/2  -cerebral edema/herniation -NS following  Central fe

## 2019-10-07 NOTE — DIETARY NOTE
[facility]    NUTRITION INITIAL ASSESSMENT    Pt does not meet malnutrition criteria. NUTRITION DIAGNOSIS/PROBLEM:  Inadequate energy intake related to inability to take PO (intubated/sedated) as evidenced by need for enteral nutrition support. meet 100% patient nutrition prescription    MEDICATIONS:  Colace, pepcid, mag sulfate, KCl,     LABS:  K 2.7    Pt is at moderate nutrition risk    FOLLOW-UP DATE: 10/11    Sagrario Salinas, MS, RD, LDN  Pager # [phone]
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
## 2019-10-07 NOTE — PLAN OF CARE
Pt has been neurologically stable throughout the day. Pt ventilated and sedated with propofol and fentanyl gtt. Pt with reactive pupils. Noting intermittent downward to the right gaze of both eyes. No focal seizures noted.   Continuous EEG initiated tod

## 2019-10-07 NOTE — PAYOR COMM NOTE
--------------  CONTINUED STAY REVIEW    Payor: DARA SHEFFIELD  Subscriber #:  [id number]  Authorization Number: [id number]    Admit date: 10/1/19  Admit time: 2140    Admitting Physician: Kumar Sales MD  Attending Physician:  MD Camilo Pillai WBC 9.9  --  5.2 3.5* 5.1   .0  --  183.0 134.0* 123.0*    < > = values in this interval not displayed.                            Recent Labs   Lab 10/01/19  2318 10/02/19  0442   10/04/19  0318   10/05/19  0458   10/06/19  0435 10/06/19  0733   Keppra 1000 mg q12, start cvEEG given seizures early this AM  -seizure precautions   -DNR     Cardiac:  · BP goal< 140  · EKG and cardiac monitor      Pulmonary:  · Intubated , Vent settings as per pulm, -ve CT Chest  Renal:  Monitor I/O’s    Stop water fl participate in the care of this patient. Please do not hesitate to call if you have any questions.    I will continue to follow with you and will make further recommendations based on his progress.     Judi Cedeno  10/6/2019    10/5  Chief Complaint: IN LAST 1 DAY:  artificial tears 83-15 % ophthalmic ointment     Date Action Dose Route User    10/7/2019 0830 Given 5 g Both Eyes Jarret Abdi, RN    10/6/2019 2114 Given 5 g Both Eyes Radha Wiley RN    10/6/2019 1603 Given 5 g Both Eyes Melissa, Jacinda Cao RN    10/6/2019 1500 Hi-Risk Rate/Dose Verify 25 mcg/hr Intravenous Jenny Wilson RN    10/6/2019 1400 Hi-Risk Rate/Dose Verify 25 mcg/hr Intravenous Antonio ORTIZ RN      Heparin Sodium (Porcine) 5000 UNIT/ML injection 5,000 Units     D Intravenous Elizabeth Russell, RN      potassium chloride IVPB premix 20 mEq     Date Action Dose Route User    10/7/2019 1208 New Bag 20 mEq Intravenous Marvin Acevedo, JUD      potassium chloride IVPB premix 40 mEq     Date Action Dose Route User

## 2019-10-07 NOTE — PROGRESS NOTES
0115--Notified by RN that patient had change in NPI reading in right eye and pupil size increased compared to left. IVF (D5.45NS) dc'd at the time and Mannitol dose to be given soon. Neuro exam otherwise unchanged.   Now post Mannitol dose RN provided upd

## 2019-10-07 NOTE — CM/SW NOTE
JORDON received call this am from CNICU RN- Dr. Marita Greer is requesting a care conference with family. JORDON reserved 6th floor conference room for 10:230am.  JORDON met with pt's brother Emily Workman, her mother and father. Family is aware of plans for care conference.   Nigel
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
## 2019-10-07 NOTE — PLAN OF CARE
Assumed care of this patient at approximately 1930. Monitor shows SR/ST 80's-100's, SBP maintained within parameters per MD orders.  Patient does not respond to name or commands, moves all extremities to pain, right lower extremity minimally, moves left claudia Monitor for signs/symptoms of CO2 retention  Outcome: Progressing     Problem: NEUROLOGICAL - ADULT  Goal: Achieves stable or improved neurological status  Description  INTERVENTIONS  - Assess for and report changes in neurological status  - Initiate measu
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
## 2019-10-08 NOTE — PROGRESS NOTES
[facility]                INFECTIOUS DISEASE PROGRESS NOTE    Migdalia Taveras Patient Status:  Inpatient    1990 MRN [id number]   [facility] 6NE-A Attending Portillo Santiago MD   Hosp Day # 7 PCP Unknown Pcp     Antibiotics:cef < >  --  2.7* 3.3* 3.1*   CL 97* 107   < > 136*   < > 120*  --   --  106  --  112   CO2 22.0 22.0   < > 18.0*   < > 29.0  --   --  29.0  --  28.0   ALKPHO 60 55  --  55  --   --   --   --   --   --   --    AST 52* 50*  --  18  --   --   --   --   --   --

## 2019-10-08 NOTE — PLAN OF CARE
Assumed care of this patient at 1. Intubated and sedated on fentanyl and Propofol. Not following commands. Moves left upper extremity spontaneously but not to command. Right upper arm decerebrate. Bilateral lower extremities withdraws to pain.  Seizure e
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
## 2019-10-08 NOTE — PROGRESS NOTES
[facility]  Neuro Critical Care Progress Note    Tej Arrow Patient Status:  Inpatient    1990 MRN [id number]   [facility] 6NE-A Attending Marques Retana MD   Hosp Day # 7 PCP Unknown Pcp     Subjective:  Had another seizure % ophthalmic ointment, , Both Eyes, TID  •  famoTIDine (PEPCID) tab 20 mg, 20 mg, Oral, BID **OR** famoTIDine (PEPCID) injection 20 mg, 20 mg, Intravenous, BID  •  morphINE sulfate (PF) 4 MG/ML injection 1 mg, 1 mg, Intravenous, Q2H PRN **OR** morphINE sul [DISCONTINUED] ondansetron HCl (ZOFRAN) injection 4 mg, 4 mg, Intravenous, Q6H PRN **OR** Metoclopramide HCl (REGLAN) injection 10 mg, 10 mg, Intravenous, Q8H PRN  •  LORazepam (ATIVAN) injection 1 mg, 1 mg, Intravenous, Q15 Min PRN  •  Normal Saline Flush and/or extensive discussions with the patient, family, and clinical staff.     Jose Luis Voss MD  Neurocritical care  [facility]  10/8/2019, 11:37 AM
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
## 2019-10-08 NOTE — PROGRESS NOTES
[facility] onsite to continue evaluation of this patient for potential organ and tissue donation. Pt continues to be eligible as an organ and tissue donor if patient progresses to brain death or if family decides to withdraw care.  Please inform [facility]
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
## 2019-10-08 NOTE — CM/SW NOTE
Care Conference held today with Dr. Kayleigh Miller, RITA RN, Social work, patients parents, brother, sister and uncle. Brother Mansoor Holding was on speaker phone.   used for parents who are Mandarin speaking  Dr. Kayleigh Miller discussed the anticipated poor neuro r

## 2019-10-08 NOTE — PAYOR COMM NOTE
--------------  CONTINUED STAY REVIEW----REQUESTING ADDITIONAL DAY 10/8      Payor: Silke Walsh 150 PPO  Subscriber #:  [id number]  Authorization Number: [id number]    Admit date: 10/1/19  Admit time: 2140    Admitting Physician: Jennifer Simmons MD  Attending Phys •  famoTIDine (PEPCID) tab 20 mg, 20 mg, Oral, BID **OR** famoTIDine (PEPCID) injection 20 mg, 20 mg, Intravenous, BID  •  morphINE sulfate (PF) 4 MG/ML injection 1 mg, 1 mg, Intravenous, Q2H PRN **OR** morphINE sulfate (PF) 4 MG/ML injection 2 mg, 2 mg, I •  [DISCONTINUED] ondansetron HCl (ZOFRAN) injection 4 mg, 4 mg, Intravenous, Q6H PRN **OR** Metoclopramide HCl (REGLAN) injection 10 mg, 10 mg, Intravenous, Q8H PRN  •  LORazepam (ATIVAN) injection 1 mg, 1 mg, Intravenous, Q15 Min PRN  •  Normal Saline Fl    10/08/2019     CA 8.0 10/08/2019            Lab Results   Component Value Date     INR 1.08 10/06/2019     INR 1.08 10/05/2019     INR 1.30 (H) 10/04/2019            Imaging: I have independently visualized all relevant chest imaging in PACS.   I Cefepime HCl (MAXIPIME) 1 g in sodium chloride 0.9% 100 mL MBP/add-vantage     Date Action Dose Route User    10/8/2019 1339 New Bag 1 g Intravenous Deshaun Joshi RN    10/8/2019 0454 New Bag 1 g Intravenous Manuel Miller RN    10/7/2019 2037 New Bag metoprolol Tartrate (LOPRESSOR) 5 MG/5ML injection 2.5 mg     Date Action Dose Route User    10/8/2019 1147 Given 2.5 mg Intravenous Lonita Meigs, RN    10/8/2019 2836 Given 2.5 mg Intravenous Lonita Meigs, RN    10/8/2019 3368 Given 2.5 mg Intravenous F

## 2019-10-09 NOTE — RESPIRATORY THERAPY NOTE
Received pt vented, PRVC 10/450/35%/+5. No changes made. Suctioning small amount of thick tan secretions from ETT. Will continue to monitor closely.

## 2019-10-09 NOTE — PROGRESS NOTES
[facility] Patient Status:  Inpatient    1990 MRN [id number]   [facility] 6NE-A Attending [facility] Day # 8 PCP Unknown Pcp     Pulm / Critical Care Progress Note     S: no change in neuro st Height:            Ventilator Settings: AC 10// FIO2 30/PEEP 5      Ppl 12      Wt Readings from Last 3 Encounters:  10/09/19 : 150 lb 9.2 oz (68.3 kg)       I/O last 3 completed shifts: In: 78748.3 [I.V.:7423.8;  Blood:462.5; JXGJM:5679; NG/GT:4 parameters per neuro intensivist  - AED per neuro intensivist.   - neurosurgery following closely  4. Coagulopathy: normalized  - s/p vitamin K with some improvement  - follow INR, plts  5.  Anemia:  - follow, transfuse for hgb < 7- no signs of active bleed
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
## 2019-10-09 NOTE — CM/SW NOTE
Received message from [address] ([phone]). Maddie Serra is available to assist with dc planning with in network providers.     Gabe Ellis RN [facility] [employer]  [phone]

## 2019-10-09 NOTE — PLAN OF CARE
Assumed care at 0730, patient resting in bed, intubated, sedated with Propofol and Fentanyl gtts. Neuro checks q1h, unchanged. Potassium replaced per electrolyte protocol. HTN treated with Labetalol prn, see MAR.  Bladder scan/straight cath q6h due to urine neurological status  - Initiate measures to prevent increased intracranial pressure  - Maintain blood pressure and fluid volume within ordered parameters to optimize cerebral perfusion and minimize risk of hemorrhage  - Monitor temperature, glucose, and so

## 2019-10-09 NOTE — PROGRESS NOTES
10/09/19 1719   Clinical Encounter Type   Visited With Family  (brother )   Routine Visit Follow-up   Continue Visiting No  (unless requested)   Patient Spiritual Encounters   Spiritual Assessment Completed No   Spiritual Interventions   ( provi

## 2019-10-09 NOTE — PROGRESS NOTES
Assumed pt care at 0730. Patient received intubated and sedated. Neuro checks Q1h. Patient withdraws to pain in all four extremities. LUE with some spontaneous and purposeful movement. Patient being bladder scan and straight cath Q6h.    Family care conf

## 2019-10-09 NOTE — PROGRESS NOTES
[facility]  Neurosurgery Progress Note    Torreybernabe Gloria Patient Status:  Inpatient    1990 MRN [id number]   [facility] 6NE-A [facility] Day # 8 PCP Unknown Pcp     Chief Complaint:  Trauma   TBI    S [address] Alaris  10/9/2019, 9:01 AM      I have seen and examined this patient and agree with the findings documented above. Stable neurologically, with poor exam.  Discussed options with family, they are considering their options.      Ashley Barrera

## 2019-10-09 NOTE — PROGRESS NOTES
[facility]  Neuro Critical Care Progress Note    Stacy Marianne Patient Status:  Inpatient    1990 MRN [id number]   [facility] 6NE-A Attending Becka Steele MD   Hosp Day # 8 PCP Unknown Pcp     Subjective:  No more seizures tod ophthalmic ointment, , Both Eyes, TID  •  famoTIDine (PEPCID) tab 20 mg, 20 mg, Oral, BID **OR** famoTIDine (PEPCID) injection 20 mg, 20 mg, Intravenous, BID  •  morphINE sulfate (PF) 4 MG/ML injection 1 mg, 1 mg, Intravenous, Q2H PRN **OR** morphINE sulfa ondansetron HCl (ZOFRAN) injection 4 mg, 4 mg, Intravenous, Q6H PRN **OR** Metoclopramide HCl (REGLAN) injection 10 mg, 10 mg, Intravenous, Q8H PRN  •  LORazepam (ATIVAN) injection 1 mg, 1 mg, Intravenous, Q15 Min PRN  •  Normal Saline Flush 0.9 % injectio family, and clinical staff.     Kaleigh Diez MD  Neurocritical care  [facility] [place] [facility]  Pager: [phone]  10/09/19 11:20 AM

## 2019-10-09 NOTE — PROCEDURES
CONTINUOUS ELECTROENCEPHALOGRAM REPORT    Patient Name: Andreia Castro  Chart ID: [id number]  Ordering Physician: Aby Barrios Study Date: 10/7 - 10/9/2019  Patient Diagnosis: Seizures    TECHNICAL SUMMARY:  A 48 hour ambulatory EEG was obtained.  There was n
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
## 2019-10-09 NOTE — PROGRESS NOTES
Resumed care of patient at 1. No new neuro changes noted during the night. Pt temperature fluctuated between 98.7 - 100.4. Ice bags under arms behind neck were refilled and placed. Pt bladder scanned Q6 during shift.  Straight cath x 2 for urinary retent

## 2019-10-09 NOTE — PROGRESS NOTES
10/09/19 1012   Clinical Encounter Type   Visited With Family; Health care provider   Referral To Nurse  (Family did not complete POLST form/Decisions pending.   Palliative care APN will follow up with POLST as needed.  )   Patient Spiritual Encounters

## 2019-10-10 NOTE — CM/SW NOTE
SW met with pt's sister and mother this am for support. Await family decision on comfort care vs trach/peg. Mable Carlson, [address] [facility] /Dischage Planner  [phone]  Pager [phone]
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
## 2019-10-10 NOTE — PROGRESS NOTES
[facility]  Neurosurgery Progress Note    Mya Rosenberg Patient Status:  Inpatient    1990 MRN [id number]   [facility] 6NE-A Attending Pricila Balderas [facility] Day # 9 PCP Unknown Pcp     Chief Complaint:  Trauma  TBI    Galloway Dawson  10/10/2019, 9:02 AM      I have seen and examined this patient and agree with the findings documented above. Neurologically stable, incisions healing well. Discussed with family, they are considering options regarding future care.      Reynaldo Negron

## 2019-10-10 NOTE — PROGRESS NOTES
[facility]                INFECTIOUS DISEASE PROGRESS NOTE    Aysha Abarca Patient Status:  Inpatient    1990 MRN [id number]   [facility] 6NE-A Attending Billie Manley MD   Hosp Day # 9 PCP Unknown Pcp     Antibiotics:cef not displayed. Vancomycin Trough (ug/mL)   Date Value   10/07/2019 3.7 (L)     Microbiology    Hospital Encounter on 10/01/19   1.  BLOOD CULTURE     Status: None (Preliminary result)    Collection Time: 10/07/19  5:02 PM   Result Value Ref Range    B

## 2019-10-10 NOTE — PROGRESS NOTES
Assessment and Objective  \"Progressing\"  INTERVENTIONS:  - Assess for changes in respiratory status  - Assess for changes in mentation and behavior  - Position to facilitate oxygenation and minimize respiratory effort  - Oxygen supplementation based on o From [address] [facility] by [employer] tube javier   Site Condition Dry   Req'd equipment at bedside Bag mask     Notes:   -Aerogen placed in-line, duoneb ordered with hypertonic saline BID.  Continue ventilation at VC+ 10/450/30%/+5  P

## 2019-10-10 NOTE — PROGRESS NOTES
[facility] Patient Status:  Inpatient    1990 MRN [id number]   [facility]NE-A [facility] PCP Unknown Pcp     Pulm / Critical Care Progress Note     S: no change.  Family wan Weight:       Height:            Ventilator Settings: AC 14// FIO2 40/PEEP 5      Ppk 18   Ppl 14      Wt Readings from Last 3 Encounters:  10/10/19 : 150 lb 5.7 oz (68.2 kg)       I/O last 3 completed shifts: In: 68846 [I.V.:5919; LKDKA:1018;  NG/ intensivist  - AED per neuro intensivist.   - neurosurgery following closely  4. Coagulopathy: normalized  - s/p vitamin K with some improvement  - follow INR, plts  5. Anemia:  - follow, transfuse for hgb < 7- no signs of active bleeding  6.  F/E/N:   - to

## 2019-10-10 NOTE — PROGRESS NOTES
Assumed patient care at [address] [place] [address]. No neuro changes during shift. q 1 neuro checks. TF infusing at 80 ml/hr with no flushes. Fentanyl and propofol drip infusing. CHG bath given along with oral care.  Labetalol given x 1 along with scheduled Metoprolol for BP con

## 2019-10-10 NOTE — RESPIRATORY THERAPY NOTE
Received pt vented, PRVC 10/450/30%/+5, tolerating well. No changes made. Will continue to monitor closely.
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
## 2019-10-11 PROBLEM — Z51.5 PALLIATIVE CARE ENCOUNTER: Status: ACTIVE | Noted: 2019-01-01

## 2019-10-11 PROBLEM — Z71.89 GOALS OF CARE, COUNSELING/DISCUSSION: Status: ACTIVE | Noted: 2019-01-01

## 2019-10-11 NOTE — RESPIRATORY THERAPY NOTE
Received patient on full vent support, PRVC 10/450/30%/+5. No changes made overnight, no events overnight. Suctioned a moderate amount of thick, white sputum. Breath sounds mostly diminished and coarse, clear with suction.   Continue BID Duoneb and 3%NaC

## 2019-10-11 NOTE — PLAN OF CARE
Assumed care at 0730, patient intubated, sedated, postures to pain but otherwise unresponsive. Neuro checks q1h, remains unchanged. Extensive palliative care conference held with family members, transitioned patient to comfort care order set.  Plan for comp

## 2019-10-11 NOTE — DIETARY NOTE
[facility]    NUTRITION INITIAL ASSESSMENT    Pt does not meet malnutrition criteria. NUTRITION DIAGNOSIS/PROBLEM:  Inadequate energy intake related to inability to take PO (intubated/sedated) as evidenced by need for enteral nutrition support. No  Cultural/Ethnic/Cheondoism Preferences Addresses: Yes    NUTRITION RELATED PHYSICAL FINDINGS:     1. Body Fat/Muscle Mass: well nourished per visual exam.     2. Fluid Accumulation: none per visual exam.    NUTRITION PRESCRIPTION: Based on admit wt 59 k

## 2019-10-11 NOTE — CM/SW NOTE
JORDON met with pt's brother Adolph Castellanos this am.  Family has decided that they want comfort care for pt. Pt's brother Mansoor Erickson is flying back in town. Adolph Castellanos states they may want to wait to withdraw care until Monday or Tuesday.     Adolph Castellanos also expressed concern re:

## 2019-10-11 NOTE — PROGRESS NOTES
[facility]  Neuro Critical Care Progress Note    [place] Patient Status:  Inpatient    1990 MRN [id number]   [facility] 6NE-A Attending Kristeen Hashimoto, MD   [facility] Day # 10 PCP Unknown Pcp     Subjective:  No changes    Objec Intravenous, Continuous  •  metoprolol Tartrate (LOPRESSOR) 5 MG/5ML injection 2.5 mg, 2.5 mg, Intravenous, Q4H  •  artificial tears 83-15 % ophthalmic ointment, , Both Eyes, TID  •  famoTIDine (PEPCID) tab 20 mg, 20 mg, Oral, BID **OR** famoTIDine (PEPCID suppository 10 mg, 10 mg, Rectal, Daily PRN  •  FLEET ENEMA (FLEET) 7-19 GM/118ML enema 133 mL, 1 enema, Rectal, Once PRN  •  [DISCONTINUED] ondansetron HCl (ZOFRAN) injection 4 mg, 4 mg, Intravenous, Q6H PRN **OR** Metoclopramide HCl (REGLAN) injection 10 care time (exclusive of billable procedures) was administered to manage and/or prevent neurologic instability. This involved direct patient intervention, complex decision making, and/or extensive discussions with the patient, family, and clinical staff.
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
## 2019-10-11 NOTE — PROGRESS NOTES
[facility]  Neurosurgery Progress Note    Farrah Armenta Patient Status:  Inpatient    1990 MRN [id number]   [facility] 6NE-A Attending Danyell Ferrer MD   Hosp Day # 10 PCP Unknown Pcp     Chief Complaint:  Trauma   TBI    [facility] [place]  10/11/2019, 8:10 AM      I have seen and examined this patient and agree with the findings documented above. Stable neurologically. Discussed with the patient's brother, Brenda Nghia.  Her family has decided on withdrawal of care after the arrival

## 2019-10-11 NOTE — PAYOR COMM NOTE
--------------  CONTINUED STAY REVIEW    Payor: [facility] PPO  Subscriber #:  [id number]  Authorization Number: [id number]    Admit date: 10/1/19  Admit time: 2140    Admitting Physician: Sabi Jackson MD  Attending Physician:  Rajesh Yan MD  Primary C Result No Growth 3 Days N/A   2. URINE CULTURE, ROUTINE     Status: None     Collection Time: 10/06/19  1:43 PM   Result Value Ref Range     Urine Culture No Growth at 18-24 hrs.  N/A                  Radiology     CXR RLL infilt     Problem list reviewed: 10/10/2019 1300 New Bag 1 g Intravenous Lucina Au RN      Chlorhexidine Gluconate (PERIDEX) 0.12 % solution 15 mL     Date Action Dose Route User    10/10/2019 1952 Given 15 mL Mouth/Throat [place]    10/10/2019 3733 Given 15 mL Mouth/Thr (Right Lower Abdomen) Thang Polo RN    10/10/2019 1301 Given 5000 Units Subcutaneous (Left Lower Abdomen) Idalmis Sellers RN      ipratropium-albuterol (DUONEB) nebulizer solution 3 mL     Date Action Dose Route User    10/10/2019 1932 Given 3 mL Neb 10/11/2019 0000 Rate/Dose Verify 25 mcg/kg/min × 59 kg Intravenous Rell Arenas RN    10/10/2019 2200 Rate/Dose Verify 25 mcg/kg/min × 59 kg Intravenous Rell Arenas RN    10/10/2019 2000 Rate/Dose Verify 25 mcg/kg/min × 59 kg Intravenous Doug, A
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
## 2019-10-11 NOTE — PROGRESS NOTES
[facility]                INFECTIOUS DISEASE PROGRESS NOTE    Sorin Pinto Patient Status:  Inpatient    1990 MRN [id number]   [facility] 6NE-A Attending Bao Carter MD   Hosp Day # 10 PCP Unknown Pcp     Antibiotics:ce 10/06/19  1:43 PM   Result Value Ref Range    Urine Culture No Growth at 18-24 hrs.  N/A           Radiology    CXR RLL infilt    Problem list reviewed:  Patient Active Problem List:     Traumatic subarachnoid bleed with LOC of 1 hour to 5 hours 59 minutes,
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
## 2019-10-11 NOTE — CONSULTS
[facility] Patient Status:  Inpatient    1990 MRN [id number]   [facility] 6NE-A Attending Jett Villatoro MD   [facility] Day # 10 PCP Unknown Pcp     Date of Consult: 10/11/19   CHAD JOHNSON Medications: Allergies:  No Known Allergies    Complete medication list reviewed.      Current Facility-Administered Medications:   •  scopolamine (TRANSDERM-SCOP) patch, 1 patch, Transdermal, Q72H  •  LORazepam (ATIVAN) injection 0.5 mg, 0.5 mg, In Continuous PRN  •  ondansetron HCl (ZOFRAN) injection 4 mg, 4 mg, Intravenous, Q6H PRN  •  fentaNYL citrate (SUBLIMAZE) 0.05 MG/ML injection 25 mcg, 25 mcg, Intravenous, Q30 Min PRN **OR** fentaNYL citrate (SUBLIMAZE) 0.05 MG/ML injection 50 mcg, 50 mcg, I patient in bed with her brother at bedside. She is unresponsive on ventilator. No purposeful movements, no non-verbal signs of discomfort or agitation.     OBJECTIVE       Hematology:   Lab Results   Component Value Date    WBC 10.2 10/11/2019    HGB 8.3 (L Partial Assist Normal or Reduced Full or confused   40 Mainly in bed Extensive Disease  Can't do any work Mainly Assist Normal or Reduced Full or confused   30 Bedbound Extensive Disease  Can't do any work Max Assist  Total Care Reduced Drowsy/confused   2 wish to continue IV abx until formal transition to comfort/extubation is initiated. Family with questions about possible length of survival after extubation - her brother asked twice for an estimate.  I advised that I am unable to provide an answer to regine extubation/comfort care model; expressing wishes to wait for other family members to be present before initiating full comfort measures. - Wishes to continue IV abx for now and discuss with remaining family members.  Aware to notify ICU team of any further

## 2019-10-12 NOTE — RESPIRATORY THERAPY NOTE
Received patient on PRVC 10/450/30%/+5. No changes made/no events overnight. Patient still produces a moderate-large amount of thick, yellow secretions. Continue BID Duoneb and 3% NaCl treatments. Continue to monitor closely.

## 2019-10-12 NOTE — PLAN OF CARE
Assumed pt care at 0730. Pt remains unresponsive and intubated. Family wishes to keep patient on comfort care measures. We will not be removing care but also not escalating. Their plan is to terminally wean patient on Monday.

## 2019-10-12 NOTE — PLAN OF CARE
Assumed care of this patient at approximately 1930. Monitor shows 's-120's, SBP remains within parameters of less than 140. Patient does not follow commands, respond to name, moves left side spontaneously, responds minimally to pain to right side.  Fe fatigue and changes in neurological status  - Encourage and assist patient to increase activity and self care with guidance from PT/OT  - Encourage visually impaired, hearing impaired and aphasic patients to use assistive/communication devices  Outcome: No
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
## 2019-10-12 NOTE — PROGRESS NOTES
Critical Care Progress Note        NAME: Migdalia Taveras - ROOM: 2150/3259-N - MRN: [id number] - Age: 29year old - : 1990  Date of Admission: 10/1/2019  7:45 PM  Admission Diagnosis: Acute subdural hematoma ([facility] [address] [S06.5X9A]  Traumatic subarachnoid ble mL/hr at 10/06/19 1721   • phenylephrine     • fentanyl (SUBLIMAZE) infusion 25 mcg/hr (10/11/19 2027)   • propofol 25 mcg/kg/min (10/12/19 0322)   • NiCARdipine Stopped (10/02/19 1800)     PRN Medication:LORazepam **OR** LORazepam, [DISCONTINUED] acetamin and inability to protect airway   - cont with full vent support. If plan is to continue aggressive care will need to proceed with trach/peg  2.  PNA; RLL infiltrate noted on imaging, at risk for aspiration  -endotracheal aspirate with +MSSA, pseudomonas, k
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
## 2019-10-13 NOTE — PLAN OF CARE
Assumed care of this patient at approximately 1930. Monitor shows 's-130's, 's-120's. Patient currently comfort care measures. Patient does not respond to name, nor follow commands, moves left side spontaneously.  Fentanyl, and propofol infusin airway patency with patient fatigue and changes in neurological status  - Encourage and assist patient to increase activity and self care with guidance from PT/OT  - Encourage visually impaired, hearing impaired and aphasic patients to use assistive/commun

## 2019-10-13 NOTE — RESPIRATORY THERAPY NOTE
Received patient on full vent support PRVC 10/450/30%/+5, tolerating well. Duoneb and 3% NaCl given BID. Breath sounds coarse/diminished which resolves post suctioning. No changes to be made throughout the night. Will continue to monitor.

## 2019-10-13 NOTE — PROGRESS NOTES
10/12/19 1923   Clinical Encounter Type   Visited With Patient and family together  (Brother and mother at bedside)   Routine Visit Follow-up   Continue Visiting No   Patient's Supportive Strategies/Resources  provided emotional support for the

## 2019-10-13 NOTE — PLAN OF CARE
Assumed care at 0730, patient on comfort care order set. Extensive care conference held this AM, plan for extubation and compassionate weaning Monday morning at 0800. Family at bedside, POC discussed, all questions answered.

## 2019-10-13 NOTE — PROGRESS NOTES
Neurological Surgery Attending    Dell Chavez    Interval History: Other than some respiratory issues related to prolonged intubation no neurologic changes overnight. Family is planning for terminal wean from ventilator tomorrow.     Interval Examination:
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
## 2019-10-14 NOTE — PROGRESS NOTES
[address] [facility] 149 Follow Up    Mariah Scott Patient Status:  Inpatient    1990 MRN [id number]   [facility] 6NE-A Attending Amie Chinchilla MD   [facility] Day # 13 PCP Unknown Pcp     Date of visit:  10/14/2019  Day 13 of hospi

## 2019-10-14 NOTE — PLAN OF CARE
Assumed care of this patient at approximately 1930. Monitor shows 's-130's, 's. Comfort care measures. Terminal wean in AM. Morphine infusing. Family at bedside throughout the night. Patient suctioned as needed.  See documentation for vitals an guidance from PT/OT  - Encourage visually impaired, hearing impaired and aphasic patients to use assistive/communication devices  Outcome: Not Progressing

## 2019-10-14 NOTE — PROGRESS NOTES
10/14/19 1047   Clinical Encounter Type   Patient's Supportive Strategies/Resources Family  providing spiritual care with patient and family.

## 2019-10-14 NOTE — DISCHARGE SUMMARY
[facility]  Discharge Summary/death note    Sorin Pinto Patient Status:  Inpatient    1990 MRN [id number]   [facility] 6NE-A Attending No att. providers found   Hosp Day # 13 PCP Unknown Pcp     Date of Admission: 10/1/2019    [place] critical care unit under neurosurgeon and trauma surgeon. Patient seen by trauma surgeon and neurosurgeon and orthopedics surgeon on 10/1/2019 night from the emergency room.     Imaging done in emergency room included  CT of the cervical spine on 10/1/2019 organs are intact. There is moderate to severe distention of the bladder. 4. There is a transverse fracture involving the lower sacrum. There is a vertical fracture involving the left sacral ala without widening of the SI joints.   5. 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   · Prominent mass effect from large supra- and infra-tentorial occipital epidural hemorrhages anteriorly displacing the superior s 10/8/2019 and continued on 100 mg every 12 hours by neurologist  Patient had noted to have aspiration pneumonia on arrival to the emergency room on CT chest abdomen pelvis and on IV antibiotics. Patient seen by ID physician on 10/6/2019 for fever.   IV cef PM    3. Procedure(s):  · SUBOCCIPUTAL CRANIOTOMY with evacuation of hematoma on 10/2/2019  7:00 PM  · By neuroSurgeon(s) and Role:     Merlinda Aline, MD - Primary     * Latoya Michelle MD - Assisting Surgeon  ·  for Surgical Findings: large occipital,
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
## 2019-10-14 NOTE — PROGRESS NOTES
Family has decided to proceed with comfort care measures. Patient compassionately weaned from ventilator. Comfort care order set in place. Will follow peripherally please call with further questions/concerns.       Lui Tay MD

## 2019-10-14 NOTE — PLAN OF CARE
Assumed care at 0730, patient extubated at [address] [place] [address] for compassionate weaning. Time of death [id number], pronounced by this RN and Edwin Marie. Family at bedside, will notify physicians.

## 2019-10-14 NOTE — RESPIRATORY THERAPY NOTE
Received patient on full vent support PRVC 10/450/30%/+5, tolerating well. Duoneb and 3% NaCl given BID. Breath sounds coarse/diminished, clear/diminished post suctioning. Suctioning up moderate amount of thick, tan secretions from ETT.  No changes to be ma
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
## 2023-06-07 NOTE — CONSULTS
[facility]  Neurosurgery Consult    Terralejandro Nettle Patient Status:  Inpatient    1990 MRN [id number]   [facility] 6NE-A Attending Ayse Kidd MD   Hosp Day # 0 PCP No primary care provider on file.      REASON FOR CONSULTATION:  P 10/01/2019    CREATSERUM 0.71 10/01/2019    BUN 13 10/01/2019     10/01/2019    K 2.9 10/01/2019    CL 91 10/01/2019    CO2 22.0 10/01/2019     10/01/2019    CA 8.9 10/01/2019    ALB 4.5 10/01/2019    ALKPHO 71 10/01/2019    BILT 0.5 10/01/201 Unknown

## 2023-08-26 NOTE — BRIEF OP NOTE
Pre-Operative Diagnosis: Subdural bleeding (HCC) [I62.00]     Post-Operative Diagnosis: Subdural bleeding ([address] [I62.00]      Procedure Performed:   Procedure(s):  LEFT CRANIECTOMY FOR SUBDURAL HEMATOMA WITH ATTEMPTED PLACEMENT OF EXTERNAL VENTRICULAR [facility] [employer]

## (undated) DEVICE — DRAIN RELIAVAC W/DRN MED 1/8

## (undated) DEVICE — KENDALL SCD EXPRESS SLEEVES, KNEE LENGTH, MEDIUM: Brand: KENDALL SCD

## (undated) DEVICE — BANDAGE ROLL,100% COTTON, 6 PLY, LARGE: Brand: KERLIX

## (undated) DEVICE — STERILE POLYISOPRENE POWDER-FREE SURGICAL GLOVES: Brand: PROTEXIS

## (undated) DEVICE — SUTURE VICRYL 2-0 CP-2

## (undated) DEVICE — SHEET,DRAPE,70X100,STERILE: Brand: MEDLINE

## (undated) DEVICE — COTTON BALLS-STRUNG 1": Brand: COTTON BALLS

## (undated) DEVICE — SUTURE ETHILON 3-0 FS-1

## (undated) DEVICE — CRANIOTOMY CDS: Brand: MEDLINE INDUSTRIES, INC.

## (undated) DEVICE — COVER,MAYO STAND,STERILE: Brand: MEDLINE

## (undated) DEVICE — CODMAN® INTEGRATED BIPOLAR CORD AND TUBING SET FLYING LEADS, ROTARY PUMP: Brand: CODMAN®

## (undated) DEVICE — DRAPE MICROSCOPE NEURO PENTERO

## (undated) DEVICE — PREMIUM WET SKIN PREP TRAY: Brand: MEDLINE INDUSTRIES, INC.

## (undated) DEVICE — DRILL SRG OIL CRTDG MAESTRO

## (undated) DEVICE — CODMAN BACTISEAL EVD CATH SET: Type: IMPLANTABLE DEVICE

## (undated) DEVICE — TRANSPOSAL ULTRAFLEX DUO/QUAD ULTRA CART MANIFOLD

## (undated) DEVICE — SOL  .9 1000ML BTL

## (undated) DEVICE — 3.0MM PRECISION NEURO (MATCH HEAD)

## (undated) DEVICE — GOWN,SIRUS,FABRIC-REINFORCED,X-LARGE: Brand: MEDLINE

## (undated) DEVICE — PAD SACRAL SPAN AID

## (undated) DEVICE — SUTURE VICRYL 0 CT-2

## (undated) DEVICE — MEDI-VAC NON-CONDUCTIVE SUCTION TUBING: Brand: CARDINAL HEALTH

## (undated) DEVICE — FLOSEAL HEMOSTATIC MATRIX, 5ML: Brand: FLOSEAL HEMOSTATIC MATRIX

## (undated) DEVICE — D-58 TAPERED ROUTER

## (undated) DEVICE — CODMAN® DISPOSABLE PERFORATOR 14MM: Brand: CODMAN®

## (undated) DEVICE — PROXIMATE SKIN STAPLERS (35 WIDE) CONTAINS 35 STAINLESS STEEL STAPLES (FIXED HEAD): Brand: PROXIMATE

## (undated) DEVICE — 1.1MM X 6.0MM STRAIGHT ROUTER

## (undated) DEVICE — SUTURE ETHILON 3-0 PS-1

## (undated) DEVICE — BONE BOWL 1000CC: Brand: MEDLINE INDUSTRIES, INC.

## (undated) DEVICE — VISTA MULTIPOST COLLAR SET

## (undated) DEVICE — SUTURE NUROLON 4-0 TF

## (undated) DEVICE — SPECIMEN CONTAINER,POSITIVE SEAL INDICATOR, OR PACKAGED: Brand: PRECISION

## (undated) DEVICE — Device

## (undated) NOTE — LETTER
[facility]  Carlos Torres [address] [facility], [address] [place] [address]    Consent for Operation    Date: __________________    Time: _______________    1.  I authorize the performance upon Isi Rizvi the following operation:    Suboccipital craniotomy, possible crani revealed by the pictures or by descriptive texts accompanying them. If the procedure has been videotaped, the surgeon will obtain the original videotape. The hospital will not be responsible for storage or maintenance of this tape.     6. For the purpose of THAT MY DOCTOR PROVIDED ME WITH THE ABOVE EXPLANATIONS, THAT ALL BLANKS OR STATEMENTS REQUIRING INSERTION OR COMPLETION WERE FILLED IN.     Signature of Patient:   ___________________________    When the patient is a minor or mentally incompetent to give co these medicines may increase my risk of anesthetic complications. · If I am allergic to anything or have had a reaction to anesthesia before. 3. I understand how the anesthesia medicine will help me (benefits).     4. I understand that with any type of patient’s representative) and answered their questions. The patient or their representative has agreed to have anesthesia services.     _____________________________________________________________________________  Witness        Date   Time  I have sunita

## (undated) NOTE — LETTER
Denisse Hein [address] [address]  [place], [address] [facility]    Consent for Operation  Date: __________________                                Time: _______________    1.  I authorize the performance upon Curlee Laser the following operation:  Procedure(s): procedure has been videotaped, the surgeon will obtain the original videotape. The hospital will not be responsible for storage or maintenance of this tape.   7. For the purpose of advancing medical education, I consent to the admittance of observers to the STATEMENTS REQUIRING INSERTION OR COMPLETION WERE FILLED IN.     Signature of Patient:   ___________________________    When the patient is a minor or mentally incompetent to give consent:  Signature of person authorized to consent for patient: ____________ drugs/illegal medications). Failure to inform my anesthesiologist about these medicines may increase my risk of anesthetic complications. iv. If I am allergic to anything or have had a reaction to anesthesia before.   3. I understand how the anesthesia med I have discussed the procedure and information above with the patient (or patient’s representative) and answered their questions. The patient or their representative has agreed to have anesthesia services.     _______________________________________________

## (undated) NOTE — LETTER
[address] [facility] FOR BLOOD OR BLOOD COMPONENTS      In the course of your treatment, it may become necessary to administer a transfusion of blood or blood components.  This form provides basic information concerning this proc explain the alternatives to you if it has not already been done. I,Francie Tijerina, have read/had read to me the above. I understand the matters bearing on the decision whether or not to authorize a transfusion of blood or blood components.  I have no question

## (undated) NOTE — LETTER
Date: 10/12/2019    Patient Name: Mya Rosenberg      To Whom it may concern: This letter has been written at the family's request. The above patient was seen at [facility] and is in critical condition.  This patient was a

## (undated) NOTE — LETTER
eDnisse Hein [phone] [facility] [address], [address] [facility]     PICC LINE INSERTION CONSENT     I agree to have a Peripherally Inserted Central Catheter (PICC) placed in my arm.    1. The PICC insertion procedure, care, maintenance, risks, benefits, and c goals; and potential problems that might occur during recuperation.  They also discussed reasonable alternatives to the PICC, including risks, benefits, and side effects related to the alternatives and risks related to not receiving this procedure      ____

## (undated) NOTE — LETTER
Denisse Hein [phone] [address]  [place], [address] [facility]    Consent for Operation  Date: __________________                                Time: _______________    1.  I authorize the performance upon Stacy Lopes the following operation:  Procedure(s): revealed by the pictures or by descriptive texts accompanying them. If the procedure has been videotaped, the surgeon will obtain the original videotape. The hospital will not be responsible for storage or maintenance of this tape.     6. For the purpose of THAT MY DOCTOR PROVIDED ME WITH THE ABOVE EXPLANATIONS, THAT ALL BLANKS OR STATEMENTS REQUIRING INSERTION OR COMPLETION WERE FILLED IN.     Signature of Patient:   ___________________________    When the patient is a minor or mentally incompetent to give co